# Patient Record
Sex: MALE | Race: ASIAN | NOT HISPANIC OR LATINO | URBAN - METROPOLITAN AREA
[De-identification: names, ages, dates, MRNs, and addresses within clinical notes are randomized per-mention and may not be internally consistent; named-entity substitution may affect disease eponyms.]

---

## 2020-10-29 ENCOUNTER — EMERGENCY (EMERGENCY)
Facility: HOSPITAL | Age: 30
LOS: 1 days | Discharge: HOME | End: 2020-10-29
Attending: STUDENT IN AN ORGANIZED HEALTH CARE EDUCATION/TRAINING PROGRAM | Admitting: STUDENT IN AN ORGANIZED HEALTH CARE EDUCATION/TRAINING PROGRAM
Payer: MEDICAID

## 2020-10-29 VITALS
HEART RATE: 100 BPM | SYSTOLIC BLOOD PRESSURE: 153 MMHG | RESPIRATION RATE: 16 BRPM | OXYGEN SATURATION: 100 % | DIASTOLIC BLOOD PRESSURE: 97 MMHG | TEMPERATURE: 98 F

## 2020-10-29 VITALS
DIASTOLIC BLOOD PRESSURE: 85 MMHG | RESPIRATION RATE: 16 BRPM | SYSTOLIC BLOOD PRESSURE: 146 MMHG | OXYGEN SATURATION: 100 % | TEMPERATURE: 98 F | HEART RATE: 87 BPM

## 2020-10-29 DIAGNOSIS — F48.9 NONPSYCHOTIC MENTAL DISORDER, UNSPECIFIED: ICD-10-CM

## 2020-10-29 DIAGNOSIS — F29 UNSPECIFIED PSYCHOSIS NOT DUE TO A SUBSTANCE OR KNOWN PHYSIOLOGICAL CONDITION: ICD-10-CM

## 2020-10-29 LAB
ALBUMIN SERPL ELPH-MCNC: 5 G/DL — SIGNIFICANT CHANGE UP (ref 3.3–5)
ALP SERPL-CCNC: 69 U/L — SIGNIFICANT CHANGE UP (ref 40–120)
ALT FLD-CCNC: 38 U/L — SIGNIFICANT CHANGE UP (ref 4–41)
AMPHET UR-MCNC: NEGATIVE — SIGNIFICANT CHANGE UP
ANION GAP SERPL CALC-SCNC: 12 MMO/L — SIGNIFICANT CHANGE UP (ref 7–14)
APAP SERPL-MCNC: < 15 UG/ML — LOW (ref 15–25)
AST SERPL-CCNC: 25 U/L — SIGNIFICANT CHANGE UP (ref 4–40)
BARBITURATES UR SCN-MCNC: NEGATIVE — SIGNIFICANT CHANGE UP
BASOPHILS # BLD AUTO: 0.06 K/UL — SIGNIFICANT CHANGE UP (ref 0–0.2)
BASOPHILS NFR BLD AUTO: 0.4 % — SIGNIFICANT CHANGE UP (ref 0–2)
BENZODIAZ UR-MCNC: NEGATIVE — SIGNIFICANT CHANGE UP
BILIRUB SERPL-MCNC: 0.5 MG/DL — SIGNIFICANT CHANGE UP (ref 0.2–1.2)
BUN SERPL-MCNC: 14 MG/DL — SIGNIFICANT CHANGE UP (ref 7–23)
CALCIUM SERPL-MCNC: 10.2 MG/DL — SIGNIFICANT CHANGE UP (ref 8.4–10.5)
CANNABINOIDS UR-MCNC: NEGATIVE — SIGNIFICANT CHANGE UP
CHLORIDE SERPL-SCNC: 102 MMOL/L — SIGNIFICANT CHANGE UP (ref 98–107)
CO2 SERPL-SCNC: 25 MMOL/L — SIGNIFICANT CHANGE UP (ref 22–31)
COCAINE METAB.OTHER UR-MCNC: NEGATIVE — SIGNIFICANT CHANGE UP
CREAT SERPL-MCNC: 0.94 MG/DL — SIGNIFICANT CHANGE UP (ref 0.5–1.3)
EOSINOPHIL # BLD AUTO: 0.08 K/UL — SIGNIFICANT CHANGE UP (ref 0–0.5)
EOSINOPHIL NFR BLD AUTO: 0.5 % — SIGNIFICANT CHANGE UP (ref 0–6)
ETHANOL BLD-MCNC: < 10 MG/DL — SIGNIFICANT CHANGE UP
GLUCOSE SERPL-MCNC: 110 MG/DL — HIGH (ref 70–99)
HCT VFR BLD CALC: 47.2 % — SIGNIFICANT CHANGE UP (ref 39–50)
HGB BLD-MCNC: 15.8 G/DL — SIGNIFICANT CHANGE UP (ref 13–17)
IMM GRANULOCYTES NFR BLD AUTO: 0.8 % — SIGNIFICANT CHANGE UP (ref 0–1.5)
LYMPHOCYTES # BLD AUTO: 20.4 % — SIGNIFICANT CHANGE UP (ref 13–44)
LYMPHOCYTES # BLD AUTO: 3.46 K/UL — HIGH (ref 1–3.3)
MCHC RBC-ENTMCNC: 27.6 PG — SIGNIFICANT CHANGE UP (ref 27–34)
MCHC RBC-ENTMCNC: 33.5 % — SIGNIFICANT CHANGE UP (ref 32–36)
MCV RBC AUTO: 82.4 FL — SIGNIFICANT CHANGE UP (ref 80–100)
METHADONE UR-MCNC: NEGATIVE — SIGNIFICANT CHANGE UP
MONOCYTES # BLD AUTO: 1.52 K/UL — HIGH (ref 0–0.9)
MONOCYTES NFR BLD AUTO: 9 % — SIGNIFICANT CHANGE UP (ref 2–14)
NEUTROPHILS # BLD AUTO: 11.7 K/UL — HIGH (ref 1.8–7.4)
NEUTROPHILS NFR BLD AUTO: 68.9 % — SIGNIFICANT CHANGE UP (ref 43–77)
NRBC # FLD: 0 K/UL — SIGNIFICANT CHANGE UP (ref 0–0)
OPIATES UR-MCNC: NEGATIVE — SIGNIFICANT CHANGE UP
OXYCODONE UR-MCNC: NEGATIVE — SIGNIFICANT CHANGE UP
PCP UR-MCNC: NEGATIVE — SIGNIFICANT CHANGE UP
PLATELET # BLD AUTO: 311 K/UL — SIGNIFICANT CHANGE UP (ref 150–400)
PMV BLD: 11.1 FL — SIGNIFICANT CHANGE UP (ref 7–13)
POTASSIUM SERPL-MCNC: 4.5 MMOL/L — SIGNIFICANT CHANGE UP (ref 3.5–5.3)
POTASSIUM SERPL-SCNC: 4.5 MMOL/L — SIGNIFICANT CHANGE UP (ref 3.5–5.3)
PROT SERPL-MCNC: 7.9 G/DL — SIGNIFICANT CHANGE UP (ref 6–8.3)
RBC # BLD: 5.73 M/UL — SIGNIFICANT CHANGE UP (ref 4.2–5.8)
RBC # FLD: 12.6 % — SIGNIFICANT CHANGE UP (ref 10.3–14.5)
SALICYLATES SERPL-MCNC: < 5 MG/DL — LOW (ref 15–30)
SARS-COV-2 RNA SPEC QL NAA+PROBE: SIGNIFICANT CHANGE UP
SODIUM SERPL-SCNC: 139 MMOL/L — SIGNIFICANT CHANGE UP (ref 135–145)
TROPONIN T, HIGH SENSITIVITY: < 6 NG/L — SIGNIFICANT CHANGE UP (ref ?–14)
WBC # BLD: 16.95 K/UL — HIGH (ref 3.8–10.5)
WBC # FLD AUTO: 16.95 K/UL — HIGH (ref 3.8–10.5)

## 2020-10-29 PROCEDURE — 70450 CT HEAD/BRAIN W/O DYE: CPT | Mod: 26

## 2020-10-29 PROCEDURE — 99284 EMERGENCY DEPT VISIT MOD MDM: CPT

## 2020-10-29 PROCEDURE — 71045 X-RAY EXAM CHEST 1 VIEW: CPT | Mod: 26

## 2020-10-29 PROCEDURE — 90792 PSYCH DIAG EVAL W/MED SRVCS: CPT

## 2020-10-29 NOTE — ED PROVIDER NOTE - OBJECTIVE STATEMENT
30M w/out pmh - p/w chest pain and sob occasionally intermittently x 1 month, was worse last night. However today patient's sister told patient to come in - because he has also been having several months of hearing the neighbors talking upstairs, also tapping on the window/floor and he thinks they are using a method to influence his mind. Reports vaping 1 yr ago but denies any substance/alcohol use presently. denies si/hi, visual hallucinations. I discussed w/ patient's mom and she agreed w/ sister's report to the RN.

## 2020-10-29 NOTE — ED ADULT NURSE NOTE - CHIEF COMPLAINT QUOTE
Pt. c/o intermittent chest pressure and sob since March. Sister called after triaging pt. stating that he's has been having hallucinations. After questioning patient, he admits to hearing voices and tapping from neighbors upstairs for a while. States "I think I need a neurological exam". No known psych hx. Denies cough, chest pain, n/v or fevers. No SI/HI. FIT MD called for JESSICA beasley.    Dodat (Father): [phone]

## 2020-10-29 NOTE — ED BEHAVIORAL HEALTH ASSESSMENT NOTE - SUICIDE PROTECTIVE FACTORS
Responsibility to family and others/Identifies reasons for living/Has future plans/Supportive social network of family or friends Rastafari beliefs/Identifies reasons for living/Has future plans/Supportive social network of family or friends/Responsibility to family and others

## 2020-10-29 NOTE — ED ADULT NURSE REASSESSMENT NOTE - NS ED NURSE REASSESS COMMENT FT1
pt calm & cooperative denies si/hi/avh presently pt cleared by psych d/c by np, pt verbalizing understanding of d/c instructions.

## 2020-10-29 NOTE — ED BEHAVIORAL HEALTH ASSESSMENT NOTE - CASE SUMMARY
30 year old single unemployed non-caregiver male currently residing in a private residence with family. No PPH. No hx of inpatient admissions or suicide attempts. No hx of aggression/violence or legal issues. No current substance abuse issues. No PMH. BIB father to ED for chest pressure.  Patient endorses periodic chest pressure related to anxiety but is unsure of triggers.  He denies other symptoms of anxiety/panic.  Patient endorses psychotic symptoms which appear to have been present since December 2019.  He endorses paranoid delusions regarding his neighbors with periodic AH of his neighbors/a female voice.  Patient denies and does not appear acutely depressed, manic or hypomanic.  He denies VH.  He is caring for himself and functioning.  He adamantly denies SI/HI/SIB/intent/plan.  Although acute psychotic symptoms are present patient has the insight and social support to seek treatment.  He does not pose an imminent danger to himself or others.  Patient was offered and refused voluntary inpatient psychiatric admission.  Collateral has no safety concerns and wishes to take patient home and agrees to take patient to treatment at [facility] available for walk-ins Monday-Friday 9am-3pm.

## 2020-10-29 NOTE — ED BEHAVIORAL HEALTH ASSESSMENT NOTE - SUMMARY
Patient is a 30 year old single unemployed non-caregiver male currently residing in a private residence with family. No PPH. No hx of inpatient admissions or suicide attempts. No hx of aggression/violence or legal issues. No current substance abuse issues. No PMH. BIB father for chest pressure.    Patient presents to the ED in the context of chest pressure. Patient endorse periodic chest pressure related to anxiety but is unsure triggers. He denies other symptoms of anxiety/panic. Patient endorses psychotic symptoms which appear to have been present since December 2019. He endorses paranoid delusions regarding his neighbors with periodic AH of his neighbors/a female voice. He also endorses delusions regarding thought insertion. Patient denies and does not appear acutely depressed, manic or hypomanic. He denies VH. He is caring for himself. Although acute psychotic symptoms are present patient has the insight and social support to seek treatment. He does not pose an imminent danger to himself or others. Patient was offered and refused inpatient psychiatric admission. He does not meet criteria for involuntary inpatient admission. Recommend Follow up at [facility] referral for treatment. Family is agreeing with this plan.    Extensive safety planning performed with patient and family. In addition to extensive discussion of safe places patient can go to, distraction techniques, coping skills and who patient can call in the event of crisis including various hotlines. Patient and family agreeing verbally to return patient to ER or call 911 if symptoms worsen or patient has urges to harm self or others.     Patient denies SI/HI/SIB/intent/plan. He is eating, sleeping and caring for self. Patient is a 30 year old single unemployed non-caregiver male currently residing in a private residence with family. No PPH. No hx of inpatient admissions or suicide attempts. No hx of aggression/violence or legal issues. No current substance abuse issues. No PMH. BIB father for chest pressure.    Patient presents to the ED in the context of chest pressure. Patient endorses periodic chest pressure related to anxiety but is unsure triggers. He denies other symptoms of anxiety/panic. Patient endorses psychotic symptoms which appear to have been present since December 2019. He endorses paranoid delusions regarding his neighbors with periodic AH of his neighbors/a female voice. Patient denies and does not appear acutely depressed, manic or hypomanic. He denies VH. He is caring for himself and functioning. He adamantly denies SI/HI/SIB/intent/plan. Although acute psychotic symptoms are present patient has the insight and social support to seek treatment. He does not pose an imminent danger to himself or others. Patient was offered and refused inpatient psychiatric admission. Collateral has no safety concerns and wishes to take patient home and agrees to take patient to treatment at [facility].  He does not meet criteria for involuntary inpatient admission. Recommend Follow up at [facility] referral for treatment. Family is agreeing with this plan.    Extensive safety planning performed with patient and family. Patient and family agreeing verbally to return patient to ER or call 911 if symptoms worsen or patient has urges to harm self or others.

## 2020-10-29 NOTE — ED BEHAVIORAL HEALTH ASSESSMENT NOTE - HPI (INCLUDE ILLNESS QUALITY, SEVERITY, DURATION, TIMING, CONTEXT, MODIFYING FACTORS, ASSOCIATED SIGNS AND SYMPTOMS)
Patient is a 30 year old single unemployed non-caregiver male currently residing in a private residence with family. No PPH. No hx of inpatient admissions or suicide attempts. No hx of aggression/violence or legal issues. No current substance abuse issues. No PMH. BIB father for chest pressure.    Patient presents to the ED in the context of chest pressure. He reports since March he has been having chest pressure that "comes and goes," when he feels anxious. He stated he is not sure what is causing him anxiety.    He reports he left his job related to COVID but also due to issues he was having at work. He stated it all started in October/December when his neighbors started harassing him about his car. He reports they would purposefully park multiple cars in his spot. In January he had an issue with parking and he believes his neighbors were purposefully blocking him from the spot. He then went to work and his colleagues mentioned something about parking. He thinks they may have known what was going on and somehow related to incident with neighbors. Then the next day the specimens he was working with were covered with urine and he believes his colleagues purposefully did this and so he quit his job. 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    Since March patient endorses periodic AH of his neighbors. He reports he hears his basement neighbor, upstairs neighbor and 2 next door neighbors. He reports they say various non specific things which occurs once a week. He stated they only made a command statement 1x in March where they instructed him to move his car. Patient did not listen to this and he has had no CAH since that time. He reports they went away for a while and most recently came back last week when he heard a voice 1x state "good looking into it."     Patient reports he has also been having AH of a female voice "outside my head," laughing when he looks at a girl he likes [employer] and June-September encouraging him to "jerk off." He reports he would have periodic erections which he thinks may have been controlled by possibly someone (he is not sure who). He stated this may have been "neurolinguistics," he read about. He stated he read knocks, or taps can program messages and he believes someone may have been causing his erections.  He reports he would then chose to masturbate and while doing so this voice would say "jerk off." He reports this issue stopped in September. He reports most recently he thinks someone may be trying to make him more Christianity and want him to pray to G-D he doesn't believe in. He reports he has not been praying and does not know who this person it.     Patient described his mood as "okay." He stated when all these problems started in March he felt sad but "I got over it." He stated he is eating, sleeping and caring for himself. Patient does not report nor exhibit any signs of rachelle, including irritable or elevated mood, grandiosity, pressured speech, risk-taking behaviors, increase in productivity or agitation. Patient denies any depressive symptoms including depressed mood, anhedonia, changes in energy/concentration/appetite, sleep disturbances, preoccupation with death or feelings of guilt. Patient adamantly denies SI, intent or plan; denies any HI, violent thoughts. Patient is a 30 year old single unemployed non-caregiver male currently residing in a private residence with family. No PPH. No hx of inpatient admissions or suicide attempts. No hx of aggression/violence or legal issues. No current substance abuse issues. No PMH. BIB father for chest pressure.    Patient presents to the ED in the context of chest pressure. He reports since March he has been having chest pressure that "comes and goes," when he feels anxious. He stated he is not sure what is causing his anxiety.    He reports he left his job related to COVID but also due to issues he was having at work. He stated it all started in October/December when his neighbors started harassing him about his car. He reports they would purposefully park multiple cars in his spot. In January he had an issue with parking and he believes his neighbors were purposefully blocking him from the spot. He then went to work and his colleagues mentioned something about parking. He thinks they may have known what was going on and somehow related to incident with neighbors. Then the next day the specimens he was working with were covered with urine and he believes his colleagues purposefully did this and so he quit his job. 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    Since March patient endorses periodic AH of his neighbors. He reports he hears his basement neighbor, upstairs neighbor and 2 next door neighbors. He reports they say various non specific things which occurs once a week. He stated they only made a command statement 1x in March where they instructed him to move his car. Patient did not listen to this and he has had no CAH since that time. He reports he would not listen to the AH because he knows they are not real. He reports they went away for a while and most recently came back last week when he heard a voice 1x last week stating "good looking into it."     Patient reports he has also been having AH of a female voice "outside my head," laughing when he looks at a girl he likes [employer] and June-September encouraging him to "jerk off." He reports he would have periodic erections which he thinks may have been possibly controlled by someone (he is not sure who). He stated this may have been "neurolinguistics," he read about. He stated he read knocks, or taps can program messages and he believes someone may have been causing his erections.  He reports he would then chose to masturbate and while doing so this voice would say "jerk off." He reports this issue stopped in September. He reports most recently since June he thinks someone may be trying to make him more Hoahaoism and want him to pray to G-D he doesn't believe in (other Episcopal G-Ds- family is Episcopal). He reports he has not been praying to them and does not know who this person is.     Patient described his mood as "okay." He stated when all these problems started in March he felt sad but "I got over it." He stated he is eating, sleeping and caring for himself. Patient does not report nor exhibit any signs of rachelle, including irritable or elevated mood, grandiosity, pressured speech, risk-taking behaviors, increase in productivity or agitation. Patient denies any depressive symptoms including depressed mood, anhedonia, changes in energy/concentration/appetite, sleep disturbances, preoccupation with death or feelings of guilt. Patient adamantly denies SI, intent or plan; denies any HI, violent thoughts.   See  note for collateral information- Father denies safety concerns. He is requesting patient be discharged for [facility] Follow up.

## 2020-10-29 NOTE — ED BEHAVIORAL HEALTH ASSESSMENT NOTE - DETAILS
n/a endorses chest pressure- Dr. Christian aware see hpi; 1x command AH- last in March to move car see hpi; 1x command AH- last in March to move car- none since then. self;father

## 2020-10-29 NOTE — ED BEHAVIORAL HEALTH ASSESSMENT NOTE - SAFETY PLAN ADDT'L DETAILS
Education provided regarding environmental safety / lethal means restriction/Provision of National Suicide Prevention Lifeline 1-745-249-EDBL (9257)/Safety plan discussed with...

## 2020-10-29 NOTE — ED BEHAVIORAL HEALTH ASSESSMENT NOTE - SAFETY PLAN DETAILS
Extensive safety planning performed with patient and family. Patient and family agreeing verbally to return patient to ER or call 911 if symptoms worsen or patient has urges to harm self or others.

## 2020-10-29 NOTE — ED BEHAVIORAL HEALTH NOTE - BEHAVIORAL HEALTH NOTE
Writer called pt's father Guevara Summers  who provided the following collateral.  Pt resides with his parents and sister.  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  Pt was brought to the ED in a taxi today by his father.  He states for 4 to 5 months patient says someone is tampering with his mind and he can't breath.  He reports hearing sounds, last reported hearing things last night voices laughing.  Pt will say it's his friends, neighbor.  Reported seeing ghosts 5 months ago.  He denies patient talks about hurting himself or anyone else.  He states 3 months ago patient told him he was going to heaven.  He does not know if patient is used drugs in school 4 to 5 years ago, but does not suspect patient is using anything now.  He states patient does not leave the house.  Patient is eating well.  Pt is sleeping well through the night 8 hours of sleep.  Pt's father bought pt assorted vitamins 5 months ago NutrHERO[employer] Made Vitamin C, Flu tablet, Multvitamin, Zquil to sleep as needed beginning 2 months ago.  He states in the past 2 months patient has been helping parents around the house.  He states some days patient will say someone is messing with his mind, sometimes weeks or months go by without that complaint.  He denies patient has done anything dangerous.  He denies any safety concerns if patient returns home today.  No guns in the house.  No legal issues, no family history of mental illness.  Pt has no medical problems.  Pt's primary care doctor is at [facility], but has not been there in 4 to 5 years.  He would like patient's blood tested for weakness or CT scan of his brain.

## 2020-10-29 NOTE — ED PROVIDER NOTE - PATIENT PORTAL LINK FT
You can access the FollowMyHealth Patient Portal offered by [facility] by registering at the following website: [email or web address]. By joining [facility]’s FollowMyHealth portal, you will also be able to view your health information using other applications (apps) compatible with our system.

## 2020-10-29 NOTE — ED PROVIDER NOTE - CLINICAL SUMMARY MEDICAL DECISION MAKING FREE TEXT BOX
30M w/ chest pain/sob subacutely - will check cardiac workup and r/o pna - however concern for auditory hallucinations, will check tox screens, d/w  for concern for new onset psychosis

## 2020-10-29 NOTE — ED PROVIDER NOTE - PHYSICAL EXAMINATION
*GEN:   comfortable, in no acute distress, AOx3  *EYES:   pupils equally round and reactive to light, extra-occular movements intact  *HEENT:   airway patent  *CV:   regular rate and rhythm  *RESP:   clear to auscultation bilaterally, non-labored  *ABD:   soft, non-tender  *:   no cva/flank tenderness  *EXTREM:   no MSK tenderness, full ROM throughout, no leg swelling  *SKIN:   dry, intact  *NEURO:   AOx3, no focal weakness or loss of sensation

## 2020-10-29 NOTE — ED BEHAVIORAL HEALTH ASSESSMENT NOTE - RISK ASSESSMENT
Low Acute Suicide Risk Acute risk factors- psychotic symptoms since December 2019, no current treatment.    Protective factors- treatment seeking, able to safety plan, future oriented, fair insight, good judgement, no aggression/violence, no substance abuse, no legal issues, no suicide attempts, no hx of inpatient admissions, no rachelle/hypomania, no depression, able to function, caring for self, sleeping/eating, no access to lethal weapons, supportive family and calm/cooperative throughout evaluation.

## 2020-10-29 NOTE — ED PROVIDER NOTE - PROGRESS NOTE DETAILS
Petey Christian MD: consulted  Petey Christian MD: patient medically cleared - no evidence of ACS, pneumonia, other dangerous etiology of chest pain - received call from , is cleared by psych for outpatient treatment and follow-up for new psychosis, not a danger to himself, patient's family willing to monitor patient while gets followup with crisis center - will dc w/ close outpt f/u

## 2020-10-29 NOTE — ED ADULT NURSE NOTE - OBJECTIVE STATEMENT
Received pt in  pt calm & cooperative c/o AH denies si/hi/vh presently, emotional reassurance provided safety & comfort measures maintained eval on going.

## 2020-10-29 NOTE — ED PROVIDER NOTE - NSFOLLOWUPINSTRUCTIONS_ED_ALL_ED_FT
Follow up with the [facility] within the next 3-5 days.     Return to the ER immediately if your symptoms worsen, or experience the following issues: worsening hallucinations, thoughts of harming yourself or anybody else.

## 2020-10-29 NOTE — ED ADULT TRIAGE NOTE - CHIEF COMPLAINT QUOTE
Pt. c/o intermittent chest pressure and sob since March. Denies cough, chest pain, n/v or fevers. No known pmhx Pt. c/o intermittent chest pressure and sob since March. Sister called after triaging pt. stating that he's has been having hallucinations. After questioning patient, he admits to hearing voices and tapping from neighbors upstairs for a while. States "I think I need a neurological exam". No known psych hx. Denies cough, chest pain, n/v or fevers. No SI/HI. FIT MD called for  gale. Pt. c/o intermittent chest pressure and sob since March. Sister called after triaging pt. stating that he's has been having hallucinations. After questioning patient, he admits to hearing voices and tapping from neighbors upstairs for a while. States "I think I need a neurological exam". No known psych hx. Denies cough, chest pain, n/v or fevers. No SI/HI. FIT MD called for JESSICA beasley.    Dodat (Father): [phone]

## 2020-10-29 NOTE — ED BEHAVIORAL HEALTH ASSESSMENT NOTE - DESCRIPTION
During course of ED visit patient was calm and cooperative. Patient was not aggressive or violent and did not require PRN medications.    Vital Signs Last 24 Hrs  T(C): 36.8 (29 Oct 2020 09:37), Max: 36.8 (29 Oct 2020 09:37)  T(F): 98.2 (29 Oct 2020 09:37), Max: 98.2 (29 Oct 2020 09:37)  HR: 100 (29 Oct 2020 09:37) (100 - 100)  BP: 153/97 (29 Oct 2020 09:37) (153/97 - 153/97)  BP(mean): --  RR: 16 (29 Oct 2020 09:37) (16 - 16)  SpO2: 100% (29 Oct 2020 09:37) (100% - 100%) none see hpi

## 2020-10-30 LAB
SARS-COV-2 IGG SERPL IA-ACNC: 0.3 RATIO — SIGNIFICANT CHANGE UP
SARS-COV-2 IGG SERPL QL IA: NEGATIVE — SIGNIFICANT CHANGE UP
SARS-COV-2 IGG SERPL QL IA: NEGATIVE — SIGNIFICANT CHANGE UP
SARS-COV-2 IGM SERPL IA-ACNC: <0.2 RATIO — SIGNIFICANT CHANGE UP

## 2021-03-14 ENCOUNTER — EMERGENCY (EMERGENCY)
Facility: HOSPITAL | Age: 31
LOS: 1 days | Discharge: HOME | End: 2021-03-14
Attending: EMERGENCY MEDICINE | Admitting: EMERGENCY MEDICINE
Payer: MEDICAID

## 2021-03-14 VITALS
TEMPERATURE: 98 F | SYSTOLIC BLOOD PRESSURE: 152 MMHG | HEART RATE: 109 BPM | RESPIRATION RATE: 16 BRPM | DIASTOLIC BLOOD PRESSURE: 99 MMHG | OXYGEN SATURATION: 99 %

## 2021-03-14 PROCEDURE — 99284 EMERGENCY DEPT VISIT MOD MDM: CPT

## 2021-03-14 PROCEDURE — 90792 PSYCH DIAG EVAL W/MED SRVCS: CPT | Mod: GC

## 2021-03-14 NOTE — ED PROVIDER NOTE - OBJECTIVE STATEMENT
29 y/o M  BIB family  secondary to   " hearing a loop  recording  and noises in his head ". Admit that this condition has exited  for over a year  , but has progressively gotten worse over the past 2 -3 months . Admits to feeling depressed and overwhelmed.    Denies SI/HI/VH.  Denies falling, punching or kicking any objects. Denies recent use of alcohol or illicit drugs.  No evidence of physical injuries, broken  skin or deformities.  Denies pain, SOB, fever, chills, chest/abdominal discomfort.

## 2021-03-14 NOTE — ED BEHAVIORAL HEALTH ASSESSMENT NOTE - SUMMARY
Patient is a 30 year old single unemployed non-caregiver male currently residing in a private residence with family. No PPH. No hx of inpatient admissions or suicide attempts. No hx of aggression/violence or legal issues. No current substance abuse issues. No PMH. BIB father for chest pressure.    Patient presents to the ED in the context of chest pressure. Patient endorses periodic chest pressure related to anxiety but is unsure triggers. He denies other symptoms of anxiety/panic. Patient endorses psychotic symptoms which appear to have been present since December 2019. He endorses paranoid delusions regarding his neighbors with periodic AH of his neighbors/a female voice. Patient denies and does not appear acutely depressed, manic or hypomanic. He denies VH. He is caring for himself and functioning. He adamantly denies SI/HI/SIB/intent/plan. Although acute psychotic symptoms are present patient has the insight and social support to seek treatment. He does not pose an imminent danger to himself or others. Patient was offered and refused inpatient psychiatric admission. Collateral has no safety concerns and wishes to take patient home and agrees to take patient to treatment at [facility].  He does not meet criteria for involuntary inpatient admission. Recommend Follow up at [facility] referral for treatment. Family is agreeing with this plan.    Extensive safety planning performed with patient and family. Patient and family agreeing verbally to return patient to ER or call 911 if symptoms worsen or patient has urges to harm self or others. Patient is a 30 year old single unemployed non-caregiver male currently residing in a private residence with family. No PPH. No hx of inpatient admissions or suicide attempts. No hx of aggression/violence or legal issues. No current substance abuse issues. No PMH. BIB father for chest pressure.    10/29/2020: Patient presents to the ED in the context of chest pressure. Patient endorses periodic chest pressure related to anxiety but is unsure triggers. He denies other symptoms of anxiety/panic. Patient endorses psychotic symptoms which appear to have been present since December 2019. He endorses paranoid delusions regarding his neighbors with periodic AH of his neighbors/a female voice. Patient denies and does not appear acutely depressed, manic or hypomanic. He denies VH. He is caring for himself and functioning. He adamantly denies SI/HI/SIB/intent/plan. Although acute psychotic symptoms are present patient has the insight and social support to seek treatment. He does not pose an imminent danger to himself or others. Patient was offered and refused inpatient psychiatric admission. Collateral has no safety concerns and wishes to take patient home and agrees to take patient to treatment at [facility].  He does not meet criteria for involuntary inpatient admission. Recommend Follow up at [facility] referral for treatment. Family is agreeing with this plan. Extensive safety planning performed with patient and family. Patient and family agreeing verbally to return patient to ER or call 911 if symptoms worsen or patient has urges to harm self or others. Patient is a 30 year old single unemployed non-caregiver male currently residing in a private residence with family. No PPH. No hx of inpatient admissions or suicide attempts. No hx of aggression/violence or legal issues. No current substance abuse issues. No PMH. BIB father for chest pressure.    10/29/2020: Patient presents to the ED in the context of chest pressure. Patient endorses periodic chest pressure related to anxiety but is unsure triggers. He denies other symptoms of anxiety/panic. Patient endorses psychotic symptoms which appear to have been present since December 2019. He endorses paranoid delusions regarding his neighbors with periodic AH of his neighbors/a female voice. Patient denies and does not appear acutely depressed, manic or hypomanic. He denies VH. He is caring for himself and functioning. He adamantly denies SI/HI/SIB/intent/plan. Although acute psychotic symptoms are present patient has the insight and social support to seek treatment. He does not pose an imminent danger to himself or others. Patient was offered and refused inpatient psychiatric admission. Collateral has no safety concerns and wishes to take patient home and agrees to take patient to treatment at [facility].  He does not meet criteria for involuntary inpatient admission. Recommend Follow up at [facility]/ referral for treatment. Family is agreeing with this plan. Extensive safety planning performed with patient and family. Patient and family agreeing verbally to return patient to ER or call 911 if symptoms worsen or patient has urges to harm self or others.    3/15/2021: Patient is a 30 year old  male, single, unemployed, domiciled with family, w/ PPH of psychosis seen in [facility] ED last October 2020 for non-command AH, discharged w/ outpt follow-up, not currently in treatment, not on medications, no hx of inpatient admissions or suicide attempts, no hx of aggression/violence or legal issues, no known substance abuse hx, w/ no known PMH. Patient BIB father and mother for cc of hearing a "continuous loop/track going on."    Per chart review, patient was seen in ED on 10/29/2020 and endorsed having psychotic symptoms that he stated had been present for about a year, including paranoid delusions regarding his neighbors, periodic AH of his neighbors and a female voice. Pt was discharged to home with outpt referral.     Patient interviewed in . Of note, patient appeared somewhat guarded and elusive. states that he is here today because for the past month, he has been hearing a "continuous loop going on," which he further describes as an auditory "track" that goes "boom boom." He states that on this track, he hears about four distinct voices of women and men that repeatedly state "clearly or fake, boom boom." He says the voices sound as if they are a pre-recorded track. He states that the track occurs every day, but periodically "they" turn it off. When asked who is they, he states he believes that his neighbors are playing a trick on him by playing this track to get him to come to the ED. When asked why the neighor is playing a trick on him, he is vague stating, "I may have done something to my neighbor online, or I may have gotten into an argument with someone about my car." He further endorses he is sure that the sound track he hears is in fact real and not his mind playing tricks on him. He states that the track sometimes plays when his family is present, and that they are unable to hear the track, which he attributes to them not wanting to upset him or the neighbors. He states that he came to the ED today with encouragement from his family because the loop gets him "agitated" and has become irritating. He states he would like to get his hearing checked.     He denies hearing other voices, visual hallucinations, or other paranoid thoughts that people are out to get him or leaving him messages. He denies depressive symptoms, anxiety, hx of anxiety/depression, SI/HI, manic symptoms, hx of trauma or PTSD. He denies current etoh/tobaco/drug use. Patient is a 30 year old [place] male, single, no dependents, unemployed, domiciled with family, w/ PPH of psychotic d/o, last seen in [facility] ED in October 2020 for paranoid delusions and auditory hallucinations, discharged w/ outpt follow-up to [facility] but patient did not follow up, not currently in treatment, not on medications, no hx of inpatient admissions or suicide attempts, no hx of aggression/violence or legal issues, no known substance abuse hx, w/ no known PMH. Patient BIB father and mother for cc of hearing a "continuous loop/track going on."    Patient is presenting with recurrent episode of psychotic symptoms of paranoid delusions and auditory hallucinations that have been on and off for over one year. Per chart review, patient was seen in ED on 10/29/2020 for psychotic sxs that started March 2019, consisting of paranoid delusions of persecution regarding his neighbors, periodic AH of his neighbors and a female voice, and thought-control (erections). Pt was discharged to home with outpt referral but did not follow up and has not been in treatment. Today, he presented to ED and is endorsing auditory hallucinations of a "loop soundtrack" x1 month and paranoid delusions about his neighbors. He denies hearing CAH, visual hallucinations, depressive symptoms, anxiety, hx of anxiety/depression, SI/HI, manic symptoms, hx of trauma or PTSD. He denies current etoh/tobaco/drug use. He insists that he is able to maintain his ADLs and states that his psychotic symptoms are not significantly impacting his daily functioning. Per collateral from parents, patient has been more isolative over the past several years, not able to hold a job, and recently has been more irritable (ie threw an ornament today). However per father, pt has not been aggressive towards self or others with no immediate safety concerns.     Given the patient has had both positive and negative sxs for over 6 months, he meets DSM-5 criteria for schizophrenia. Patient has not been in treatment and would benefit from medication and therapy. Patient was educated on his diagnosis but has limited insight. Patient was offered and refused voluntary inpatient psychiatric admission. At this time, he is not an immediate danger to himself or others and does not meet criteria for involuntary inpatient admission. Family was involved in discussion of his treatment plan and has agreed to take patient for outpt treatment at [facility], which is available for walk-ins Monday-Friday 9am-3pm. Plan was discussed and information for crisis clinic/ emergency hotline was provided to patient and father. Patient is a 30 year old [place] male, single, no dependents, unemployed, domiciled with family, w/ PPH of psychotic d/o, last seen in [facility] ED in October 2020 for paranoid delusions and auditory hallucinations, discharged w/ outpt follow-up to [facility] but patient did not follow up, not currently in treatment, not on medications, no hx of inpatient admissions or suicide attempts, no hx of aggression/violence or legal issues, no known substance abuse hx, w/ no known PMH. Patient BIB father and mother for cc of hearing a "continuous loop/track going on."    Patient is presenting with recurrent episode of psychotic symptoms of paranoid delusions and auditory hallucinations that have been on and off for over one year. Per chart review, patient was seen in ED on 10/29/2020 for psychotic sxs that started March 2019, consisting of paranoid delusions of persecution regarding his neighbors, periodic AH of his neighbors and a female voice, and thought-control (erections). Medical and Utox workup were negative. Pt was dx w psychotic d/o and discharged to home with outpt referral but did not follow up and has not been in treatment.     Today, he presented to ED and is endorsing auditory hallucinations of a "loop soundtrack" x1 month and paranoid delusions about his neighbors. He denies hearing CAH, visual hallucinations, depressive symptoms, anxiety, hx of anxiety/depression, SI/HI, manic symptoms, hx of trauma or PTSD. He denies current etoh/tobaco/drug use. He insists that he is able to maintain his ADLs and states that his psychotic symptoms are not significantly impacting his daily functioning. Per collateral from parents, patient has been more isolative over the past several years, not able to hold a job, and recently has been more irritable (ie threw an ornament today). However per father, pt has not been aggressive towards self or others with no immediate safety concerns.     Given the patient has had both positive and negative sxs for over 6 months, he meets DSM-5 criteria for schizophrenia. Patient has not been in treatment and would benefit from medication and therapy. Patient was educated on his diagnosis but has limited insight. Patient was offered and refused voluntary inpatient psychiatric admission. At this time, he is not an immediate danger to himself or others and does not meet criteria for involuntary inpatient admission. Family was involved in discussion of his treatment plan and has agreed to take patient for outpt treatment at [facility], which is available for walk-ins Monday-Friday 9am-3pm. Plan was discussed and information for crisis clinic/ emergency hotline was provided to patient and father. Patient is a 30 year old [place] male, single, no dependents, unemployed, domiciled with family, w/ PPH of psychotic d/o, last seen in [facility] ED in October 2020 for paranoid delusions and auditory hallucinations, discharged w/ outpt follow-up to [facility] but patient did not follow up, not currently in treatment, not on medications, no hx of inpatient admissions or suicide attempts, no hx of aggression/violence or legal issues, no known substance abuse hx, w/ no known PMH. Patient BIB father and mother for cc of hearing a "continuous loop/track going on."    Patient is presenting with recurrent episode of psychotic symptoms of paranoid delusions and auditory hallucinations that have been on and off for over one year. Per chart review, patient was seen in ED on 10/29/2020 for psychotic sxs that started March 2019, consisting of paranoid delusions of persecution regarding his neighbors, periodic AH of his neighbors and a female voice, and thought-control (erections). Medical and Utox workup were negative. Pt was dx w psychotic d/o and discharged to home with outpt referral but did not follow up and has not been in treatment.     Today, he presented to ED and is endorsing auditory hallucinations of a "loop soundtrack" x1 month and paranoid delusions about his neighbors. He denies hearing CAH, visual hallucinations, depressive symptoms, anxiety, hx of anxiety/depression, SI/HI, manic symptoms, hx of trauma or PTSD. He denies current etoh/tobaco/drug use. He insists that he is able to maintain his ADLs and states that his psychotic symptoms are not significantly impacting his daily functioning. Per collateral from parents, patient has been more isolative over the past several years, not able to hold a job, and recently has been more irritable (ie threw an ornament today). However per father, pt has not been aggressive towards self or others with no immediate safety concerns.     Given the patient has had both positive and negative sxs for over 6 months, he meets DSM-5 criteria for schizophrenia. Patient has not been in treatment and would benefit from medication and therapy. Patient was educated on his diagnosis but has limited insight. Patient was offered and refused voluntary inpatient psychiatric admission. At this time, he is not an immediate danger to himself or others and does not meet criteria for involuntary inpatient admission. Family was involved in discussion of his treatment plan and has agreed to take patient for outpt treatment at [facility], which is available for walk-ins Monday-Friday 9am-3pm. Plan was discussed and information for crisis clinic/ emergency hotline was provided to patient and father.    Addendum : The writer was contacted by Dr. Linn pt's father returned hour after discharge stating the pt ran away from him. He was informed by Dr. Linn that pt is adult and has the right exercise his decisions . This writer attempted to reach the father whose phone was disconnected, called pt's mother who initially picked up but disconnected the phone and would not pick upon several attempts.

## 2021-03-14 NOTE — ED PROVIDER NOTE - CLINICAL SUMMARY MEDICAL DECISION MAKING FREE TEXT BOX
29 y/o M    Medical evaluation performed. There is no clinical evidence of intoxication or any acute medical problem requiring immediate intervention. Patient is awaiting psychiatric consultation. Final disposition will be determined by psychiatrist.

## 2021-03-14 NOTE — ED BEHAVIORAL HEALTH ASSESSMENT NOTE - DESCRIPTION
see hpi During course of ED visit patient was calm and cooperative. Patient was not aggressive or violent and did not require PRN medications.    ICU Vital Signs Last 24 Hrs  T(C): 36.8 (14 Mar 2021 22:56), Max: 36.8 (14 Mar 2021 22:56)  T(F): 98.3 (14 Mar 2021 22:56), Max: 98.3 (14 Mar 2021 22:56)  HR: 109 (14 Mar 2021 22:56) (109 - 109)  BP: 152/99 (14 Mar 2021 22:56) (152/99 - 152/99)  BP(mean): --  ABP: --  ABP(mean): --  RR: 16 (14 Mar 2021 22:56) (16 - 16)  SpO2: 99% (14 Mar 2021 22:56) (99% - 99%) none

## 2021-03-14 NOTE — ED BEHAVIORAL HEALTH ASSESSMENT NOTE - OTHER
[facility] Endorses recent auditory hallucination/ paranoid delusions of neighbors playing tricks on him. Denies current AVH. Does not appear internally preoccupied. As above.

## 2021-03-14 NOTE — ED BEHAVIORAL HEALTH ASSESSMENT NOTE - HPI (INCLUDE ILLNESS QUALITY, SEVERITY, DURATION, TIMING, CONTEXT, MODIFYING FACTORS, ASSOCIATED SIGNS AND SYMPTOMS)
Patient is a 30 year old  male, single, unemployed, domiciled with family, w/ PPH of psychosis seen in [facility] ED last October 2020 for non-command AH, discharged w/ outpt follow-up, not currently in treatment, not on medications, no hx of inpatient admissions or suicide attempts, no hx of aggression/violence or legal issues, no known substance abuse hx, w/ no known PMH. Patient BIB father and mother for cc of hearing a "continuous loop/track going on."    Per chart review, patient was seen in ED on 10/29/2020 and endorsed having psychotic symptoms that he stated had been present for about a year, including paranoid delusions regarding his neighbors, periodic AH of his neighbors and a female voice. Pt was discharged to home with outpt referral.     Patient interviewed in . Of note, patient appeared somewhat guarded and elusive. states that he is here today because for the past month, he has been hearing a "continuous loop going on," which he further describes as an auditory "track" that goes "boom boom." He states that on this track, he hears about four distinct voices of women and men that repeatedly state "clearly or fake, boom boom." He says the voices sound as if they are a pre-recorded track. He states that the track occurs every day, but periodically "they" turn it off. When asked who is they, he states he believes that his neighbors are playing a trick on him by playing this track to get him to come to the ED. When asked why the neighor is playing a trick on him, he is vague stating, "I may have done something to my neighbor online, or I may have gotten into an argument with someone about my car." He further endorses he is sure that the sound track he hears is in fact real and not his mind playing tricks on him. He states that the track sometimes plays when his family is present, and that they are unable to hear the track, which he attributes to them not wanting to upset him or the neighbors. He states that he came to the ED today with encouragement from his family because the loop gets him "agitated" and has become irritating. He states he would like to get his hearing checked.     He denies hearing other voices, visual hallucinations, or other paranoid thoughts that people are out to get him or leaving him messages. He denies depressive symptoms, anxiety, hx of anxiety/depression, SI/HI, manic symptoms, hx of trauma or PTSD. He denies current etoh/tobaco/drug use.     He reports that the loop track is not interfering with his ADLs, his sleeping or eating. He states he currently lives with his family, socializes with his friends, John and Adeel, as well as other girls online. He states he is currently unemployed but hopes to find work. He endorses spending his time doing things he enjoys such as reading, watching TV, and playing video games. Patient is a 30 year old  male, single, unemployed, domiciled with family, w/ PPH of psychosis seen in [facility] ED last October 2020 for non-command AH, discharged w/ outpt follow-up, not currently in treatment, not on medications, no hx of inpatient admissions or suicide attempts, no hx of aggression/violence or legal issues, no known substance abuse hx, w/ no known PMH. Patient BIB father and mother for cc of hearing a "continuous loop/track going on."    Per chart review, patient was seen in ED on 10/29/2020 and endorsed having psychotic symptoms that he stated had been present for about a year, including paranoid delusions regarding his neighbors, periodic AH of his neighbors and a female voice. Pt was discharged to home with outpt referral.     Patient interviewed in . Of note, patient appeared somewhat guarded and elusive. states that he is here today because for the past month, he has been hearing a "continuous loop going on," which he further describes as an auditory "track" that goes "boom boom." He states that on this track, he hears about four distinct voices of women and men that repeatedly state "clearly or fake, boom boom." He says the voices sound as if they are a pre-recorded track. He states that the track occurs every day, but periodically "they" turn it off. When asked who is they, he states he believes that his neighbors are playing a trick on him by playing this track to get him to come to the ED. When asked why the neighor is playing a trick on him, he is vague stating, "I may have done something to my neighbor online, or I may have gotten into an argument with someone about my car." He further endorses he is sure that the sound track he hears is in fact real and not his mind playing tricks on him. He states that the track sometimes plays when his family is present, and that they are unable to hear the track, which he attributes to them not wanting to upset him or the neighbors. He states that he came to the ED today with encouragement from his family because the loop gets him "agitated" and has become irritating. He states he would like to get his hearing checked.     He denies hearing other voices, visual hallucinations, or other paranoid thoughts that people are out to get him or leaving him messages. He denies depressive symptoms, anxiety, hx of anxiety/depression, SI/HI, manic symptoms, hx of trauma or PTSD. He denies current etoh/tobaco/drug use.     He reports that the loop track is not interfering with his ADLs, his sleeping or eating. He states he currently lives with his family, socializes with his friends, John and Adeel, as well as other girls online. He states he is currently unemployed but hopes to find work. He endorses spending his time doing things he enjoys such as reading, watching TV, and playing video games. Patient stated he is not interested in going to inpatient psychiatric hospital at this time.     See SW note for collateral from family. Writer and attending spoke with father and provided information for crisis center. Patient is a 30 year old [place] male, single, no dependents, unemployed, domiciled with family, w/ PPH of psychotic d/o, last seen in [facility] ED in October 2020 for paranoid delusions and auditory hallucinations, discharged w/ outpt follow-up to [facility] but patient did not follow up, not currently in treatment, not on medications, no hx of inpatient admissions or suicide attempts, no hx of aggression/violence or legal issues, no known substance abuse hx, w/ no known PMH. Patient BIB father and mother for cc of hearing a "continuous loop/track going on."    Per chart review, patient was seen in ED on 10/29/2020 and endorsed having psychotic symptoms that he stated had been present for about a year, including paranoid delusions regarding his neighbors, periodic AH of his neighbors and a female voice. Pt was discharged to home with outpt referral.     Patient interviewed in . Of note, patient appeared somewhat guarded and elusive. states that he is here today because for the past month, he has been hearing a "continuous loop going on," which he further describes as an auditory "track" that goes "boom boom." He states that on this track, he hears about four distinct voices of women and men that repeatedly state "clearly or fake, boom boom." He says the voices sound as if they are a pre-recorded track. He states that the track occurs every day, but periodically "they" turn it off. When asked who is they, he states he believes that his neighbors are playing a trick on him by playing this track to get him to come to the ED. When asked why the neighor is playing a trick on him, he is vague stating, "I may have done something to my neighbor online, or I may have gotten into an argument with someone about my car." He further endorses he is sure that the sound track he hears is in fact real and not his mind playing tricks on him. He states that the track sometimes plays when his family is present, and that they are unable to hear the track, which he attributes to them not wanting to upset him or the neighbors. He states that he came to the ED today with encouragement from his family because the loop gets him "agitated" and has become irritating. He states he would like to get his hearing checked.     He denies hearing other voices, visual hallucinations, or other paranoid thoughts that people are out to get him or leaving him messages. He denies depressive symptoms, anxiety, hx of anxiety/depression, SI/HI, manic symptoms, hx of trauma or PTSD. He denies current etoh/tobaco/drug use.     He reports that the loop track is not interfering with his ADLs, his sleeping or eating. He states he currently lives with his family, socializes with his friends, John and Adeel, as well as other girls online. He states he is currently unemployed but hopes to find work. He endorses spending his time doing things he enjoys such as reading, watching TV, and playing video games. Patient stated he is not interested in going to inpatient psychiatric hospital at this time.     See SW note for collateral from family. Writer and attending spoke with father and provided information for crisis center. Patient is a 30 year old [place] male, single, no dependents, unemployed, domiciled with family, w/ PPH of psychotic d/o, last seen in [facility] ED in October 2020 for paranoid delusions and auditory hallucinations, discharged w/ outpt follow-up to [facility] but patient did not follow up, not currently in treatment, not on medications, no hx of inpatient admissions or suicide attempts, no hx of aggression/violence or legal issues, no known substance abuse hx, w/ no known PMH. Patient BIB father and mother for cc of hearing a "continuous loop/track going on."    Per chart review, patient was seen in ED on 10/29/2020 and endorsed having psychotic symptoms that he stated had been present for about a year, including paranoid delusions regarding his neighbors, periodic AH of his neighbors and a female voice. Pt was discharged to home with outpt referral.     Patient interviewed in . Of note, patient appeared somewhat guarded and elusive. Patient states that he is here today because for the past month, he has been hearing a "continuous loop going on," which he further describes as an auditory "track" that goes "boom boom." He states that on this track, he hears about four distinct voices of women and men that repeatedly state "clearly or fake, boom boom." He says the voices sound as if they are a pre-recorded track. He states that the track occurs every day, but periodically "they" turn it off. When asked who is they, he states he believes that his neighbors are playing a trick on him by playing this track to get him to come to the ED. When asked why the neighor is playing a trick on him, he is vague stating, "I may have done something to my neighbor online, or I may have gotten into an argument with someone about my car." He further endorses he is sure that the sound track he hears is in fact real and not his mind playing tricks on him. He states that the track sometimes plays when his family is present, and that they are unable to hear the track, which he attributes to them not wanting to upset him or the neighbors. He states that he came to the ED today with encouragement from his family because the loop gets him "agitated" and has become irritating. He states he would like to get his hearing checked.     He denies hearing other voices, visual hallucinations, or other paranoid thoughts that people are out to get him or leaving him messages. He denies depressive symptoms, anxiety, hx of anxiety/depression, SI/HI, manic symptoms, hx of trauma or PTSD. He denies current etoh/tobaco/drug use.     He reports that the loop track is not interfering with his ADLs, his sleeping or eating. He states he currently lives with his family, socializes with his friends, John and Adeel, as well as other girls online. He states he is currently unemployed but hopes to find work. He endorses spending his time doing things he enjoys such as reading, watching TV, and playing video games. Patient stated he is not interested in going to inpatient psychiatric hospital at this time.     See SW note for collateral from family. Writer and attending spoke with father and provided information for crisis center. Patient is a 30 year old Spanish male, single, no dependents, unemployed, domiciled with family, w/ PPH of psychotic d/o, last seen in [facility] ED in October 2020 for paranoid delusions and auditory hallucinations, discharged w/ outpt follow-up to [facility] but patient did not follow up, not currently in treatment, not on medications, no hx of inpatient admissions or suicide attempts, no hx of aggression/violence or legal issues, no known substance abuse hx, w/ no known PMH. Patient BIB father and mother for cc of hearing a "continuous loop/track going on."    Per chart review, patient was seen in ED on 10/29/2020 and endorsed having psychotic symptoms that he stated had been present for about a year, including paranoid delusions regarding his neighbors, periodic AH of his neighbors and a female voice. Pt was discharged to home with outpt referral.     Patient interviewed in . Of note, patient appeared somewhat guarded and elusive. Patient states that he is here today because for the past month, he has been hearing a "continuous loop going on," which he further describes as an auditory "track" that goes "boom boom." He states that on this track, he hears about four distinct voices of women and men that repeatedly state "clearly or fake, boom boom." He says the voices sound as if they are a pre-recorded track. He states that the track occurs every day, but periodically "they" turn it off. When asked who is they, he states he believes that his neighbors are playing a trick on him by playing this track to get him to come to the ED. When asked why the neighor is playing a trick on him, he is vague stating, "I may have done something to my neighbor online, or I may have gotten into an argument with someone about my car." He further endorses he is sure that the sound track he hears is in fact real and not his mind playing tricks on him. He states that the track sometimes plays when his family is present, and that they are unable to hear the track, which he attributes to them not wanting to upset him or the neighbors. He states that he came to the ED today with encouragement from his family because the loop gets him "agitated" and has become irritating. He states he would like to get his hearing checked.     He denies hearing other voices, visual hallucinations, or other paranoid thoughts that people are out to get him or leaving him messages. He denies depressive symptoms, anxiety, hx of anxiety/depression, SI/HI, manic symptoms, hx of trauma or PTSD. He denies current etoh/tobaco/drug use.     Patient endorses having heard voices in the past. He states that he followed up w/ outpt psychiatry and allegedly did not receive any psychiatric diagnosis. He denies being prescribed any medications at that time. He states that the voices had resolved, and he denies having any symptoms until 1 month ago when the "loop" began.     He reports that the loop track is not interfering with his ADLs, and endorses good sleep and appetite. He states he currently lives with his family, socializes with his friends, John and Adeel, as well as other girls online. He states he is currently unemployed but hopes to find work. He endorses spending his time doing things he enjoys such as reading, watching TV, and playing video games. Patient stated he is not interested in going to inpatient psychiatric hospital at this time.     See SW note for collateral from family. Writer and attending spoke with father and provided information for crisis center. Patient is a 30 year old [place] male, single, no dependents, unemployed, domiciled with family, w/ PPH of psychotic d/o, last seen in [facility] ED in October 2020 for paranoid delusions and auditory hallucinations, discharged w/ outpt follow-up to [facility] but patient did not follow up, not currently in treatment, not on medications, no hx of inpatient admissions or suicide attempts, no hx of aggression/violence or legal issues, no known substance abuse hx, w/ no known PMH. Patient BIB father and mother for cc of hearing a "continuous loop/track going on."    Per chart review, patient was seen in ED on 10/29/2020 and endorsed having psychotic symptoms that he stated had been present for about a year, including paranoid delusions regarding his neighbors, periodic AH of his neighbors and a female voice. Pt was discharged to home with outpt referral.     Patient interviewed in . Of note, patient appeared somewhat guarded and elusive. Patient states that he is here today because for the past month, he has been hearing a "continuous loop going on," which he further describes as an auditory "track" that goes "boom boom." He states that on this track, he hears about four distinct voices of women and men that repeatedly state "clearly or fake, boom boom." He says the voices sound as if they are a pre-recorded track. He states that the track occurs every day, but periodically "they" turn it off. When asked who is they, he states he believes that his neighbors are playing a trick on him by playing this track to get him to come to the ED. When asked why the neighor is playing a trick on him, he is vague stating, "I may have done something to my neighbor online, or I may have gotten into an argument with someone about my car." He further endorses he is sure that the sound track he hears is in fact real and not his mind playing tricks on him. He states that the track sometimes plays when his family is present, and that they are unable to hear the track, which he attributes to them not wanting to upset him or the neighbors. He states that he came to the ED today with encouragement from his family because the loop gets him "agitated" and has become irritating. He states he would like to get his hearing checked.     He denies  visual hallucinations, or other paranoid thoughts that people are out to get him or leaving him messages. He denies depressive symptoms, anxiety, hx of anxiety/depression, SI/HI, manic symptoms, hx of trauma or PTSD. He denies current etoh/tobaco/drug use.     Patient endorses having heard voices in the past. He states that he followed up w/ outpt psychiatry and allegedly did not receive any psychiatric diagnosis. He denies being prescribed any medications at that time. He states that the voices had resolved, and he denies having any symptoms until 1 month ago when the "loop" began.     He reports that the loop track is not interfering with his ADLs, and endorses good sleep and appetite. He states he currently lives with his family, socializes with his friends, John and Adeel, as well as other girls online. He states he is currently unemployed but hopes to find work. He endorses spending his time doing things he enjoys such as reading, watching TV, and playing video games. Patient stated he is not interested in going to inpatient psychiatric hospital at this time.     See SW note for collateral from family. Writer and attending spoke with father and provided information for crisis center.

## 2021-03-14 NOTE — ED BEHAVIORAL HEALTH ASSESSMENT NOTE - SAFETY PLAN ADDT'L DETAILS
Safety plan discussed with.../Education provided regarding environmental safety / lethal means restriction/Provision of National Suicide Prevention Lifeline 6-863-933-[facility] (4316)

## 2021-03-14 NOTE — ED BEHAVIORAL HEALTH ASSESSMENT NOTE - DIFFERENTIAL
Schizophreniform  Schizophrenia  MDD with psychosis  Schizoaffective d/o Schizophrenia  R/O Schizoaffective d/o  R/O substance-induced psychotic disorder (no utox available) Dx Schizophrenia  R/O Schizoaffective d/o  R/O substance-induced psychotic disorder (no utox available) Dx Schizophrenia  R/O Schizoaffective d/o  R/O Substance-induced psychotic disorder (no utox available; prior utox at time of psychosis in 10/2020 was neg.)

## 2021-03-14 NOTE — ED PROVIDER NOTE - PROGRESS NOTE DETAILS
bernard: pt hd stable, signed out by dr malik, currently cleared by psych. stated to have schizophrenia but no signs of danger to self or others. pt referred to crisis center. family updated.

## 2021-03-14 NOTE — ED BEHAVIORAL HEALTH ASSESSMENT NOTE - RELATEDNESS
[facility] Topical Sulfur Applications Counseling: Topical Sulfur Counseling: Patient counseled that this medication may cause skin irritation or allergic reactions.  In the event of skin irritation, the patient was advised to reduce the amount of the drug applied or use it less frequently.   The patient verbalized understanding of the proper use and possible adverse effects of topical sulfur application.  All of the patient's questions and concerns were addressed.

## 2021-03-14 NOTE — ED PROVIDER NOTE - PATIENT PORTAL LINK FT
You can access the FollowMyHealth Patient Portal offered by [facility] by registering at the following website: [email or web address]. By joining [employer]’s FollowMyHealth portal, you will also be able to view your health information using other applications (apps) compatible with our system.

## 2021-03-14 NOTE — ED BEHAVIORAL HEALTH ASSESSMENT NOTE - CASE SUMMARY
Patient was seen , evaluated along with DR. Brand, discussed elements of hpi/ros/mse and agree with the above assessment and plan. Patient is a 30 year old [place] male, single, no dependents, unemployed, domiciled with family, w/ PPH of psychotic d/o, last seen in [facility] ED in October 2020 for paranoid delusions and auditory hallucinations, discharged w/ outpt follow-up to [facility] but patient did not follow up, not currently in treatment, not on medications, no hx of inpatient admissions or suicide attempts, no hx of aggression/violence or legal issues, no known substance abuse hx, w/ no known PMH. Patient BIB father and mother for cc of hearing a "continuous loop/track going on."    On assessment pt is guarded , but forthcoming about his delusions and ah , however  poor insight and reality testing . Patient overall is calm in ED except when his father was on the phone with the pt trying to convince for the pt to sign himself in to the hospital. Pt became upset asking to leave  but quickly was redirectable and sat back on the chair . He was explained and educated about the benefits of being hospitalized , and extensive psychoeducation regarding his sx and illness were provided to the pt.  Pt declines hospitalization , and while pt would benefit from hospitalization he does not meet criteria for involuntary commitment , due to lack of acute safety concerns , no si/hi, no hx of aggression towards anyone , has been able to attend to his ADL's , during the interview he does not appear to be internally preoccupied and  was organized in behavior and thought content. The general sx of not being able to hold a job, isolation , are likely part of negative sx of Schizophrenia and not considered acute. Patient does not exhibit any acute mood sx . Pt is cleared for discrage. Discussed disposition with the father , he agreed to take the son to [facility] tomorrow in am.

## 2021-03-14 NOTE — ED BEHAVIORAL HEALTH ASSESSMENT NOTE - OTHER PAST PSYCHIATRIC HISTORY (INCLUDE DETAILS REGARDING ONSET, COURSE OF ILLNESS, INPATIENT/OUTPATIENT TREATMENT)
Seen in [facility] ED on 10/29/2020 for psychotic sxs.   No hx of inpatient admissions or suicide attempts.  No hx of psych medication treatment.

## 2021-03-14 NOTE — ED BEHAVIORAL HEALTH ASSESSMENT NOTE - RISK ASSESSMENT
Low Acute Suicide Risk Acute risk factors- psychotic symptoms since December 2019, no current treatment.    Protective factors- treatment seeking, able to safety plan, future oriented, fair insight, good judgement, no aggression/violence, no substance abuse, no legal issues, no suicide attempts, no hx of inpatient admissions, no rachelle/hypomania, no depression, able to function, caring for self, sleeping/eating, no access to lethal weapons, supportive family and calm/cooperative throughout evaluation. Risk factors- psychotic symptoms, hx of psychotic disorder, hx of command AH, no current treatment, unemployed.     Protective factors- treatment seeking, able to safety plan, future oriented, fair insight, good judgement, no aggression/violence, no access to lethal weapons, supportive family. Patient currently denies sxs of depression/SI/HI. He endorses psychotic symptoms.     Chronic risk factors: Male gender  hx of psychotic disorder, hx of command AH, no current treatment, unemployed hx of remote substance use.    Protective factors: help-seeking, future-oriented, no access to lethal weapons, supportive family, sobriety.    While patient would benefit from inpatient treatment for his psychotic symptoms, patient is not an acute risk of danger to self and others and does not meet involuntary inpatient criteria for safety and stabilization.

## 2021-03-14 NOTE — ED ADULT NURSE NOTE - OBJECTIVE STATEMENT
Pt received to [facility]. Pt presents calm and cooperative; states he is experiencing AH with a "loop recording" saying the words "Boom, Bam, Bang, Fake" over and over again in his head. Pt states it started in December and he was seen at the crisis clinic and afterwards voices ceased but started again about a week ago. Pt denies SI/HI. Pt awaiting psych evaluation.

## 2021-03-14 NOTE — ED ADULT TRIAGE NOTE - CHIEF COMPLAINT QUOTE
Pt reports he is " hearing a loop recorder in his house". Pt fells like someone is moving his hands back and forth, "feeling little twitches here and there". denies visual hallucinations. denies SI/HI. pt calm and cooperative in triage. denies any PMH.
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
## 2021-03-15 DIAGNOSIS — F23 BRIEF PSYCHOTIC DISORDER: ICD-10-CM
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
## 2021-05-05 ENCOUNTER — OUTPATIENT (OUTPATIENT)
Dept: OUTPATIENT SERVICES | Facility: HOSPITAL | Age: 31
LOS: 1 days | Discharge: STILL ADMITTED OR ADMINISTRATIVE | End: 2021-05-05

## 2021-05-05 PROBLEM — Z78.9 OTHER SPECIFIED HEALTH STATUS: Chronic | Status: ACTIVE | Noted: 2021-03-14

## 2021-05-06 DIAGNOSIS — F29 UNSPECIFIED PSYCHOSIS NOT DUE TO A SUBSTANCE OR KNOWN PHYSIOLOGICAL CONDITION: ICD-10-CM

## 2021-05-17 ENCOUNTER — OUTPATIENT (OUTPATIENT)
Dept: OUTPATIENT SERVICES | Facility: HOSPITAL | Age: 31
LOS: 1 days | Discharge: HOME | End: 2021-05-17
Payer: MEDICAID

## 2021-05-18 DIAGNOSIS — F31.4 BIPOLAR DISORDER, CURRENT EPISODE DEPRESSED, SEVERE, WITHOUT PSYCHOTIC FEATURES: ICD-10-CM

## 2021-05-19 PROCEDURE — 90792 PSYCH DIAG EVAL W/MED SRVCS: CPT | Mod: 95

## 2021-10-29 ENCOUNTER — EMERGENCY (EMERGENCY)
Facility: HOSPITAL | Age: 31
LOS: 1 days | Discharge: HOME | End: 2021-10-29
Attending: EMERGENCY MEDICINE | Admitting: EMERGENCY MEDICINE
Payer: MEDICAID

## 2021-10-29 VITALS
DIASTOLIC BLOOD PRESSURE: 102 MMHG | TEMPERATURE: 98 F | SYSTOLIC BLOOD PRESSURE: 138 MMHG | RESPIRATION RATE: 16 BRPM | HEART RATE: 113 BPM | OXYGEN SATURATION: 100 %

## 2021-10-29 VITALS
DIASTOLIC BLOOD PRESSURE: 78 MMHG | HEART RATE: 92 BPM | OXYGEN SATURATION: 97 % | RESPIRATION RATE: 18 BRPM | SYSTOLIC BLOOD PRESSURE: 130 MMHG | TEMPERATURE: 98 F

## 2021-10-29 PROCEDURE — 99284 EMERGENCY DEPT VISIT MOD MDM: CPT | Mod: 25

## 2021-10-29 PROCEDURE — 93010 ELECTROCARDIOGRAM REPORT: CPT

## 2021-10-29 NOTE — ED PROVIDER NOTE - PROGRESS NOTE DETAILS
Pedro HUTTON (PGY-2)  reassessed pt. A&Ox3 and remembers what happened, however still appears very sleepy/tired. not clinically sober as of yet although tiredness is improving. will reassess Anastasia PGY3: pt reassessed, awake and asking for discharge. Conversational, have contacted family for pickup. Pt was re-evaluated at bedside, VSS, feeling better overall. Results were discussed with patient as well as return precautions and follow up plan with PCP and/or specialist. Time was taken to answer any questions that the patient had before providing them with discharge paperwork.

## 2021-10-29 NOTE — ED ADULT TRIAGE NOTE - CHIEF COMPLAINT QUOTE
pt brought in for overdose on edible marijuana. Per ems this is his first time using edible. pt ate a 500mg cookie. pt has PMH of schizophrenia on Abilify. pt sleeping but arrousable.

## 2021-10-29 NOTE — ED PROVIDER NOTE - ATTENDING CONTRIBUTION TO CARE
I performed a face-to-face evaluation of the patient and performed a history and physical examination. I agree with the history and physical examination.    Likely cannabis-related somnolence. No e/o trauma/infection. Check EKG and BG. Await until more awake. Then, PO and walking trial.

## 2021-10-29 NOTE — ED ADULT NURSE NOTE - ASSOCIATED SYMPTOMS
pt presents altered post ingesting a "weed cookie" 500mg, Patient is saturating well without any difficulty, arrousible to stimuli. very sleepy

## 2021-10-29 NOTE — ED ADULT NURSE REASSESSMENT NOTE - NS ED NURSE REASSESS COMMENT FT1
report received from overnight RN. pt is awake, alert and oriented. NAD. pt denies cp, sob, n/v/d, chills, weakness. respirations are even and un labored. skin intact. safety precautions maintained.

## 2021-10-29 NOTE — ED PROVIDER NOTE - PHYSICAL EXAMINATION
Well appearing, well nourished, somnolent, arousable, oriented to person, place, in no apparent distress.    Airway patent    Eyes without scleral injection. No jaundice.    Strong pulse. No M/R/G.    Respirations unlabored. Lungs clear.    Abdomen soft, non-tender, no guarding.    Spine appears normal, range of motion is not limited, no muscle or joint tenderness.    Alert and oriented x 2, no gross motor or sensory deficits.    Skin normal color for race, warm, dry and intact. No evidence of rash.    No SI/HI.

## 2021-10-29 NOTE — ED PROVIDER NOTE - PATIENT PORTAL LINK FT
You can access the FollowMyHealth Patient Portal offered by [facility] by registering at the following website: [email or web address]. By joining [email or web address]’s FollowMyHealth portal, you will also be able to view your health information using other applications (apps) compatible with our system.

## 2021-10-29 NOTE — ED ADULT NURSE REASSESSMENT NOTE - NS ED NURSE REASSESS COMMENT FT1
Pt remains sleepy and unable to stay awake for conversation. Pt states that he ingested an entire "weed cookie" and asked his parents to call an ambulance. He is unable to endorse any pain or discomfort at this time. Arousal to painful stimuli. pending discharge upon sobriety

## 2021-10-29 NOTE — ED PROVIDER NOTE - OBJECTIVE STATEMENT
Sharita: Schizophrenia or schizo-affective disorder (Abilify). Took first-ever dose of marijuana edible (500 mg cookie). P/w somnolence. No e/o trauma/F. No N/V/D/ Sx.

## 2023-07-24 NOTE — ED ADULT NURSE NOTE - ALCOHOL PRE SCREEN (AUDIT - C)
[FreeTextEntry1] : chronic RV failure, pHTN with RHC suggestive of non-cardiac etiology\par MARLEY with dilated ascending aorta\par HTN\par proteinuria\par CAD\par \par BP improved\par continue amlodipine 10mg daily\par -cont carvedilol 6.25mg BID\par -cont hydralazine 25mg TID for now\par -start losartan 25mg daily, check labs in two weeks to eval SCr, electrolytes\par -furosemide 20mg every other day\par -start asa 81mg daily for non-obs CAD\par -atorvastatin 20mg daily for goal LDL < 70\par -will consider use of sildenafil\par - surveillance TTE after 3/2024 for ascending aorta dilation
Statement Selected

## 2025-06-06 ENCOUNTER — EMERGENCY (EMERGENCY)
Facility: HOSPITAL | Age: 35
LOS: 1 days | End: 2025-06-06
Attending: EMERGENCY MEDICINE | Admitting: EMERGENCY MEDICINE
Payer: COMMERCIAL

## 2025-06-06 VITALS
SYSTOLIC BLOOD PRESSURE: 148 MMHG | HEART RATE: 89 BPM | TEMPERATURE: 99 F | OXYGEN SATURATION: 99 % | RESPIRATION RATE: 18 BRPM | DIASTOLIC BLOOD PRESSURE: 90 MMHG

## 2025-06-06 VITALS
WEIGHT: 229.94 LBS | DIASTOLIC BLOOD PRESSURE: 79 MMHG | HEART RATE: 99 BPM | TEMPERATURE: 98 F | RESPIRATION RATE: 16 BRPM | OXYGEN SATURATION: 96 % | SYSTOLIC BLOOD PRESSURE: 124 MMHG | HEIGHT: 71 IN

## 2025-06-06 LAB
ALBUMIN SERPL ELPH-MCNC: 4.2 G/DL — SIGNIFICANT CHANGE UP (ref 3.3–5)
ALP SERPL-CCNC: 60 U/L — SIGNIFICANT CHANGE UP (ref 40–120)
ALT FLD-CCNC: 34 U/L — SIGNIFICANT CHANGE UP (ref 4–41)
ANION GAP SERPL CALC-SCNC: 12 MMOL/L — SIGNIFICANT CHANGE UP (ref 7–14)
AST SERPL-CCNC: 20 U/L — SIGNIFICANT CHANGE UP (ref 4–40)
BILIRUB SERPL-MCNC: 0.9 MG/DL — SIGNIFICANT CHANGE UP (ref 0.2–1.2)
BUN SERPL-MCNC: 10 MG/DL — SIGNIFICANT CHANGE UP (ref 7–23)
CALCIUM SERPL-MCNC: 9.7 MG/DL — SIGNIFICANT CHANGE UP (ref 8.4–10.5)
CHLORIDE SERPL-SCNC: 103 MMOL/L — SIGNIFICANT CHANGE UP (ref 98–107)
CO2 SERPL-SCNC: 25 MMOL/L — SIGNIFICANT CHANGE UP (ref 22–31)
CREAT SERPL-MCNC: 1.01 MG/DL — SIGNIFICANT CHANGE UP (ref 0.5–1.3)
EGFR: 101 ML/MIN/1.73M2 — SIGNIFICANT CHANGE UP
EGFR: 101 ML/MIN/1.73M2 — SIGNIFICANT CHANGE UP
GLUCOSE SERPL-MCNC: 82 MG/DL — SIGNIFICANT CHANGE UP (ref 70–99)
HCT VFR BLD CALC: 40.7 % — SIGNIFICANT CHANGE UP (ref 39–50)
HGB BLD-MCNC: 14.1 G/DL — SIGNIFICANT CHANGE UP (ref 13–17)
HIV 1+2 AB+HIV1 P24 AG SERPL QL IA: SIGNIFICANT CHANGE UP
MCHC RBC-ENTMCNC: 28.1 PG — SIGNIFICANT CHANGE UP (ref 27–34)
MCHC RBC-ENTMCNC: 34.6 G/DL — SIGNIFICANT CHANGE UP (ref 32–36)
MCV RBC AUTO: 81.2 FL — SIGNIFICANT CHANGE UP (ref 80–100)
NRBC # BLD AUTO: 0 K/UL — SIGNIFICANT CHANGE UP (ref 0–0)
NRBC # FLD: 0 K/UL — SIGNIFICANT CHANGE UP (ref 0–0)
NRBC BLD AUTO-RTO: 0 /100 WBCS — SIGNIFICANT CHANGE UP (ref 0–0)
PLATELET # BLD AUTO: 292 K/UL — SIGNIFICANT CHANGE UP (ref 150–400)
POTASSIUM SERPL-MCNC: 3.8 MMOL/L — SIGNIFICANT CHANGE UP (ref 3.5–5.3)
POTASSIUM SERPL-SCNC: 3.8 MMOL/L — SIGNIFICANT CHANGE UP (ref 3.5–5.3)
PROT SERPL-MCNC: 7.6 G/DL — SIGNIFICANT CHANGE UP (ref 6–8.3)
RBC # BLD: 5.01 M/UL — SIGNIFICANT CHANGE UP (ref 4.2–5.8)
RBC # FLD: 13 % — SIGNIFICANT CHANGE UP (ref 10.3–14.5)
SODIUM SERPL-SCNC: 140 MMOL/L — SIGNIFICANT CHANGE UP (ref 135–145)
WBC # BLD: 15.83 K/UL — HIGH (ref 3.8–10.5)
WBC # FLD AUTO: 15.83 K/UL — HIGH (ref 3.8–10.5)

## 2025-06-06 PROCEDURE — 74177 CT ABD & PELVIS W/CONTRAST: CPT | Mod: 26

## 2025-06-06 PROCEDURE — 99285 EMERGENCY DEPT VISIT HI MDM: CPT

## 2025-06-06 RX ORDER — AMOXICILLIN AND CLAVULANATE POTASSIUM 500; 125 MG/1; MG/1
1 TABLET, FILM COATED ORAL
Qty: 14 | Refills: 0
Start: 2025-06-06 | End: 2025-06-12

## 2025-06-06 RX ORDER — ONDANSETRON HCL/PF 4 MG/2 ML
4 VIAL (ML) INJECTION ONCE
Refills: 0 | Status: COMPLETED | OUTPATIENT
Start: 2025-06-06 | End: 2025-06-06

## 2025-06-06 RX ORDER — AMPICILLIN SODIUM AND SULBACTAM SODIUM 1; .5 G/1; G/1
3 INJECTION, POWDER, FOR SOLUTION INTRAMUSCULAR; INTRAVENOUS ONCE
Refills: 0 | Status: COMPLETED | OUTPATIENT
Start: 2025-06-06 | End: 2025-06-06

## 2025-06-06 RX ORDER — LIDOCAINE HCL/EPINEPHRINE/PF 1 %-1:200K
20 AMPUL (ML) INJECTION ONCE
Refills: 0 | Status: COMPLETED | OUTPATIENT
Start: 2025-06-06 | End: 2025-06-06

## 2025-06-06 RX ORDER — ACETAMINOPHEN 500 MG/5ML
1000 LIQUID (ML) ORAL ONCE
Refills: 0 | Status: COMPLETED | OUTPATIENT
Start: 2025-06-06 | End: 2025-06-06

## 2025-06-06 RX ORDER — KETOROLAC TROMETHAMINE 30 MG/ML
15 INJECTION, SOLUTION INTRAMUSCULAR; INTRAVENOUS ONCE
Refills: 0 | Status: DISCONTINUED | OUTPATIENT
Start: 2025-06-06 | End: 2025-06-06

## 2025-06-06 RX ORDER — AMPICILLIN SODIUM AND SULBACTAM SODIUM 1; .5 G/1; G/1
INJECTION, POWDER, FOR SOLUTION INTRAMUSCULAR; INTRAVENOUS
Refills: 0 | Status: DISCONTINUED | OUTPATIENT
Start: 2025-06-06 | End: 2025-06-06

## 2025-06-06 RX ADMIN — Medication 400 MILLIGRAM(S): at 14:35

## 2025-06-06 RX ADMIN — Medication 4 MILLIGRAM(S): at 19:08

## 2025-06-06 RX ADMIN — AMPICILLIN SODIUM AND SULBACTAM SODIUM 200 GRAM(S): 1; .5 INJECTION, POWDER, FOR SOLUTION INTRAMUSCULAR; INTRAVENOUS at 16:33

## 2025-06-06 RX ADMIN — Medication 20 MILLILITER(S): at 19:14

## 2025-06-06 RX ADMIN — KETOROLAC TROMETHAMINE 15 MILLIGRAM(S): 30 INJECTION, SOLUTION INTRAMUSCULAR; INTRAVENOUS at 16:54

## 2025-06-06 NOTE — ED PROVIDER NOTE - PROGRESS NOTE DETAILS
Surgery was consulted for the 4.7 cm perirectal abscess, currently on antibiotics, likely need incision and drainage, awaiting surgical consultation and recommendations Incision and drainage performed by surgery, patient understands all findings we will discharge home on oral antibiotics, outpatient follow-up

## 2025-06-06 NOTE — ED ADULT NURSE REASSESSMENT NOTE - NS ED NURSE REASSESS COMMENT FT1
Pt is A&Ox4, resting in stretcher with complaints of 8/10 rectal pain at this time. Respirations even and unlabored, chest rise equal b/l. VS as noted in flow sheets. Pt denies chest pain, SOB, abdominal pain, N/V/D, h/a, dizziness, numbness/tingling or any urinary symptoms at this time. No acute distress noted. Safety maintained throughout.

## 2025-06-06 NOTE — ED PROVIDER NOTE - NSFOLLOWUPINSTRUCTIONS_ED_ALL_ED_FT
- You were seen in the emergency department today for Right gluteal pain.  You are found to have a abscess which was drained.  You are given antibiotics here, and you are prescribed an oral antibiotic to take at home for 7 days called Augmentin.  Please follow medication instructions and warnings.  If your symptoms worsen please return to the emergency department.  Please follow-up with your primary care physician in 1 week in order to assess the progression of your symptoms and hopefully improvement.    - Lab and imaging results, if performed, were discussed with you along with your discharge diagnosis    - Follow up with your doctor in 1 week - bring copies of your results if you were given. If you are supposed  to follow up with a specialist, please bring your results with you as well.     - Return to the ED for any new, worsening, or concerning symptoms to you    - Continue all prescribed medications.    - Take ibuprofen/tylenol as directed as needed for pain.     - Rest and keep yourself hydrated with fluids.      Abscess    An abscess is an infected area that contains a collection of pus and debris. It can occur in almost any part of the body and occurs when the tissue gets infection. Symptoms include a painful mass that is red, warm, tender that might break open and HAVE drainage. If your health care provider gave you antibiotics make sure to take the full course and do not stop even if feeling better.     SEEK IMMEDIATE MEDICAL CARE IF YOU HAVE ANY OF THE FOLLOWING SYMPTOMS: chills, fever, muscle aches, or red streaking from the area. - You were seen in the emergency department today for Right gluteal pain.  You are found to have a abscess which was drained.  You are given antibiotics here, and you are prescribed an oral antibiotic to take at home for 7 days called Augmentin.  Please follow medication instructions and warnings.  If your symptoms worsen please return to the emergency department.  Please follow-up with surgery in 1 week in order to assess the progression of your symptoms and hopefully improvement.    - Lab and imaging results, if performed, were discussed with you along with your discharge diagnosis    - Follow up with your doctor in 1 week - bring copies of your results if you were given. If you are supposed  to follow up with a specialist, please bring your results with you as well.     - Return to the ED for any new, worsening, or concerning symptoms to you    - Continue all prescribed medications.    - Take ibuprofen/tylenol as directed as needed for pain.     - Rest and keep yourself hydrated with fluids.      Abscess    An abscess is an infected area that contains a collection of pus and debris. It can occur in almost any part of the body and occurs when the tissue gets infection. Symptoms include a painful mass that is red, warm, tender that might break open and HAVE drainage. If your health care provider gave you antibiotics make sure to take the full course and do not stop even if feeling better.     SEEK IMMEDIATE MEDICAL CARE IF YOU HAVE ANY OF THE FOLLOWING SYMPTOMS: chills, fever, muscle aches, or red streaking from the area. - You were seen in the emergency department today for Right gluteal pain.  You are found to have a abscess which was drained.  You are given antibiotics here, and you are prescribed an oral antibiotic to take at home for 7 days called Augmentin.  Please follow medication instructions and warnings.  If your symptoms worsen please return to the emergency department.  Please follow-up with surgery in 1 week in order to assess the progression of your symptoms and hopefully improvement.    - Please soak your gluteal area in a sitz bath 3 times a day.    - Lab and imaging results, if performed, were discussed with you along with your discharge diagnosis    - Follow up with your doctor in 1 week - bring copies of your results if you were given. If you are supposed  to follow up with a specialist, please bring your results with you as well.     - Return to the ED for any new, worsening, or concerning symptoms to you    - Continue all prescribed medications.    - Take ibuprofen/tylenol as directed as needed for pain.     - Rest and keep yourself hydrated with fluids.      Abscess    An abscess is an infected area that contains a collection of pus and debris. It can occur in almost any part of the body and occurs when the tissue gets infection. Symptoms include a painful mass that is red, warm, tender that might break open and HAVE drainage. If your health care provider gave you antibiotics make sure to take the full course and do not stop even if feeling better.     SEEK IMMEDIATE MEDICAL CARE IF YOU HAVE ANY OF THE FOLLOWING SYMPTOMS: chills, fever, muscle aches, or red streaking from the area.

## 2025-06-06 NOTE — CONSULT NOTE ADULT - ASSESSMENT
PLAN:  - S/p bedside I&D  - Please send home with 1 week of Augmentin  - sitz baths TID  - Dressing change PRN  - Please f/u with Dr Denney in 1 week.       Discussed with Dr Jumana Jordan PGY3  A Team surgery  [id number]   34M with 4.6 cm perirectal abscess. WBC 15.     PLAN:  - S/p bedside I&D  - Please send home with 1 week of Augmentin  - sitz baths TID  - Dressing change PRN  - Please f/u with Dr Denney in 1 week.       Discussed with Dr Jumana Jordan PGY3  A Team surgery  [id number]

## 2025-06-06 NOTE — ED PROVIDER NOTE - PHYSICAL EXAMINATION
Justin Knowles DO (PGY1)   Physical Exam:    Gen: NAD, AOx3  Head: NCAT  HEENT: EOMI, PEERLA, pink and moist mucous membranes  Lung: CTAB, no respiratory distress, no wheezes/rhonchi/rales B/L  CV: RRR, no murmurs, rubs or gallops  Abd: soft, NT, ND, no guarding, no rigidity, no rebound tenderness, no CVA tenderness   MSK: no visible deformities, ROM normal in UE/LE, no back pain  Neuro: No focal sensory or motor deficits. Sensation intact to light touch all extremities.  Skin:   perirectal examination was done upon the supervision of Dr. Bloch and chaperone of ED tech. tenderness to palpation on the right perirectal area approximately in the medial gluteal crease,  firmness, but no overlying erythema  or warmth, no ingrown hair nor drainage noted.  Psych: normal affect, calm

## 2025-06-06 NOTE — ED ADULT TRIAGE NOTE - CHIEF COMPLAINT QUOTE
Patient c/o lump on the  right  side of buttock started a week ago and its getting worsen. Patient stated he feels feverish yesterday.

## 2025-06-06 NOTE — ED PROVIDER NOTE - ATTENDING CONTRIBUTION TO CARE
DR. BLOCH, ATTENDING MD-  I performed a face to face bedside interview with patient regarding history of present illness, review of symptoms and past medical history. I completed an independent physical exam.  I have discussed patient's plan of care with the resident.  Patient well-appearing the warm HEENT unremarkable no jaundice noted, neck supple heart sounds S1-S2 mildly tachycardic lungs clear abdomen soft nontender right buttock with 4 x 4 cm induration extending to anus concern for perianal or perirectal abscess.  Patient febrile.  Will give antibiotics CT abdomen pain medication and reassess

## 2025-06-06 NOTE — ED PROVIDER NOTE - PATIENT PORTAL LINK FT
You can access the FollowMyHealth Patient Portal offered by [facility] by registering at the following website: [email or web address]. By joining [email or web address]’s FollowMyHealth portal, you will also be able to view your health information using other applications (apps) compatible with our system.

## 2025-06-06 NOTE — ED ADULT NURSE NOTE - OBJECTIVE STATEMENT
Pt arrives ambulatory on room air c/o right buttock abscess x1 week. Pt with no acute distress. Rectally febrile Pt respirations even and unlabored, chest rise and fall equal b/l. Pt denies chest pain, HA, SOB, dizziness, N/V/D. 20g placed to LAC, labs collected and sent. Pt pending CT. Stretcher in lowest position, call bell within reach, pt safety maintained.

## 2025-06-06 NOTE — ED PROVIDER NOTE - CARE PROVIDER_API CALL
Macy Denney  Colon/Rectal Surgery  [address] [place] [id number]  Phone: [phone]  Fax: [phone]  Follow Up Time: 7-10 Days

## 2025-06-06 NOTE — ED PROVIDER NOTE - OBJECTIVE STATEMENT
34-year-old male past medical history of schizophrenia, presents today for 1 weeks worth of progressive gluteal region pain.  Atraumatic, notes that he might have had an ingrown hair in the region.  Yesterday he had subjective fevers was feeling warm, did not measure it, but no chills or night sweats.  Has not noticed any pus or blood drainage.  No urinary symptoms, no dysuria, pyuria, any concerns for STDs.  Denies chest pain, shortness of breath, abdominal pain, nausea, vomiting.

## 2025-06-06 NOTE — CONSULT NOTE ADULT - SUBJECTIVE AND OBJECTIVE BOX
Patient is a 34y old  Male who presents with a chief complaint of     HPI:      PAST MEDICAL & SURGICAL HISTORY:  No pertinent past medical history      No significant past surgical history          REVIEW OF SYSTEMS    General:	Negative  Skin/Breast: Negative	  Ophthalmologic: Negative  ENMT: Negative  Respiratory and Thorax: Negative  Cardiovascular: Negative  Gastrointestinal: Negative  Genitourinary: Negative  Musculoskeletal: Negative  Neurological: Negative  Psychiatric: Negative  Hematology/Lymphatics: Negative  Endocrine:	Negative  Allergic/Immunologic:	 Negative      MEDICATIONS  (STANDING):    MEDICATIONS  (PRN):      Allergies    No Known Allergies    Intolerances        SOCIAL HISTORY:  Alcohol: Denied  Smoking: Nonsmoker  Drug Use: Denied  Marital Status:         FAMILY HISTORY:  No pertinent family history in first degree relatives        Vital Signs Last 24 Hrs  T(C): 37.3 (06 Jun 2025 18:55), Max: 38 (06 Jun 2025 14:20)  T(F): 99.2 (06 Jun 2025 18:55), Max: 100.4 (06 Jun 2025 14:20)  HR: 89 (06 Jun 2025 18:55) (89 - 99)  BP: 148/90 (06 Jun 2025 18:55) (124/79 - 148/90)  BP(mean): --  RR: 18 (06 Jun 2025 18:55) (16 - 18)  SpO2: 99% (06 Jun 2025 18:55) (96% - 99%)    Parameters below as of 06 Jun 2025 18:55  Patient On (Oxygen Delivery Method): room air        PHYSICAL EXAM:  Constitutional: well developed, well nourished, NAD  Eyes: anicteric  ENMT: normal facies, symmetric  Neck: supple  Respiratory: CTA bilat  Cardiovascular: RRR  Gastrointestinal: abdomen soft, nontender, nondistended. No obvious masses. No peritonitis  Extremities: FROM, warm  Neurological: intact, non-focal  Skin: no gross lesions  Lymph Nodes: no gross adenopathy  Musculoskeletal: equal strength bilateral upper and lower extremities  Psychiatric: oriented x 3; appropriate  Rectal:        LABS:                        14.1   15.83 )-----------( 292      ( 06 Jun 2025 15:47 )             40.7     06-06    140  |  103  |  10  ----------------------------<  82  3.8   |  25  |  1.01    Ca    9.7      06 Jun 2025 15:47    TPro  7.6  /  Alb  4.2  /  TBili  0.9  /  DBili  x   /  AST  20  /  ALT  34  /  AlkPhos  60  06-06      Urinalysis Basic - ( 06 Jun 2025 15:47 )    Color: x / Appearance: x / SG: x / pH: x  Gluc: 82 mg/dL / Ketone: x  / Bili: x / Urobili: x   Blood: x / Protein: x / Nitrite: x   Leuk Esterase: x / RBC: x / WBC x   Sq Epi: x / Non Sq Epi: x / Bacteria: x        RADIOLOGY & ADDITIONAL STUDIES: Patient is a 34y old  Male who presents with a chief complaint of perianal pain     HPI:     34-year-old male past medical history of schizophrenia, presents today for 1 weeks worth of progressive gluteal region pain.  Atraumatic, notes that he might have had an ingrown hair in the region.  Yesterday he had subjective fevers was feeling warm, did not measure it, but no chills or night sweats.  Has not noticed any pus or blood drainage.  No urinary symptoms, no dysuria, pyuria, any concerns for STDs.  Denies chest pain, shortness of breath, abdominal pain, nausea, vomiting.    WBC 15, febrile to 100.4. CT: 4.6 perirectal abscess.         Vital Signs Last 24 Hrs  T(C): 37.3 (06 Jun 2025 18:55), Max: 38 (06 Jun 2025 14:20)  T(F): 99.2 (06 Jun 2025 18:55), Max: 100.4 (06 Jun 2025 14:20)  HR: 89 (06 Jun 2025 18:55) (89 - 99)  BP: 148/90 (06 Jun 2025 18:55) (124/79 - 148/90)  BP(mean): --  RR: 18 (06 Jun 2025 18:55) (16 - 18)  SpO2: 99% (06 Jun 2025 18:55) (96% - 99%)    Parameters below as of 06 Jun 2025 18:55  Patient On (Oxygen Delivery Method): room air        PHYSICAL EXAM:  Constitutional: well developed, well nourished, NAD  Respiratory: unlabored breathing   Cardiovascular: RRR  Gastrointestinal: abdomen soft, nontender, nondistended. No obvious masses. No peritonitis  Extremities: FROM, warm  Rectal: Right Perianal abscess, induration and erythema.         LABS:                        14.1   15.83 )-----------( 292      ( 06 Jun 2025 15:47 )             40.7     06-06    140  |  103  |  10  ----------------------------<  82  3.8   |  25  |  1.01    Ca    9.7      06 Jun 2025 15:47    TPro  7.6  /  Alb  4.2  /  TBili  0.9  /  DBili  x   /  AST  20  /  ALT  34  /  AlkPhos  60  06-06      Urinalysis Basic - ( 06 Jun 2025 15:47 )    Color: x / Appearance: x / SG: x / pH: x  Gluc: 82 mg/dL / Ketone: x  / Bili: x / Urobili: x   Blood: x / Protein: x / Nitrite: x   Leuk Esterase: x / RBC: x / WBC x   Sq Epi: x / Non Sq Epi: x / Bacteria: x        RADIOLOGY & ADDITIONAL STUDIES:

## 2025-06-06 NOTE — PROCEDURE NOTE - ADDITIONAL PROCEDURE DETAILS
Safely aspirated with needle to find the pocket of the abscess, the cut down in that area, obtained 50-80  cc of pus

## 2025-06-06 NOTE — ED PROVIDER NOTE - CLINICAL SUMMARY MEDICAL DECISION MAKING FREE TEXT BOX
34-year-old male past medical history of schizophrenia, presents today for 1 weeks worth of progressive gluteal region pain.  physical examination remarkable for tenderness to palpation on the right perirectal area approximately in the medial gluteal crease,  firmness, but no overlying erythema  or warmth, no ingrown hair nor drainage noted.  differential diagnosis includes but is not limited to perirectal abscess, localized inflammation versus an early growth of cellulitis, ingrown hair, folliculitis possible does not seem to be consistent with a pilonidal cyst due to the location close to the rectum.  Plan includes labs and imaging for the above, if drainable and there is a localized collection we will consult surgery as his perirectal abscess, will initiate IV antibiotics, patient is noted to be febrile here but overall well-appearing, reassess after imaging and symptomatic management

## 2025-06-06 NOTE — ED PROVIDER NOTE - DISPOSITION TYPE
Health Maintenance       DTaP/Tdap/Td Vaccine (1 - Tdap)  Overdue since 5/6/2021    Traditional Medicare- Medicare Wellness Visit (Yearly)  Due since 4/1/2025    Depression Screening (Yearly)  Due soon on 4/25/2025    COVID-19 Vaccine (13 - 2024-25 season)  Due soon on 4/25/2025           Following review of the above:  Patient wishes to discuss with clinician: COVID-19 and Dtap/Tdap/Td    Note: Refer to final orders and clinician documentation.       DISCHARGE

## 2025-06-07 LAB
HCV AB S/CO SERPL IA: 0.11 S/CO — SIGNIFICANT CHANGE UP (ref 0–0.79)
HCV AB SERPL-IMP: SIGNIFICANT CHANGE UP

## 2025-06-13 ENCOUNTER — APPOINTMENT (OUTPATIENT)
Dept: COLORECTAL SURGERY | Facility: CLINIC | Age: 35
End: 2025-06-13
Payer: COMMERCIAL

## 2025-06-13 VITALS
OXYGEN SATURATION: 97 % | DIASTOLIC BLOOD PRESSURE: 83 MMHG | HEART RATE: 83 BPM | BODY MASS INDEX: 33.04 KG/M2 | TEMPERATURE: 97.9 F | SYSTOLIC BLOOD PRESSURE: 125 MMHG | HEIGHT: 71 IN | WEIGHT: 236 LBS | RESPIRATION RATE: 16 BRPM

## 2025-06-13 PROCEDURE — 99204 OFFICE O/P NEW MOD 45 MIN: CPT

## 2025-07-11 ENCOUNTER — APPOINTMENT (OUTPATIENT)
Dept: COLORECTAL SURGERY | Facility: CLINIC | Age: 35
End: 2025-07-11